# Patient Record
Sex: MALE | URBAN - METROPOLITAN AREA
[De-identification: names, ages, dates, MRNs, and addresses within clinical notes are randomized per-mention and may not be internally consistent; named-entity substitution may affect disease eponyms.]

---

## 2021-05-27 ENCOUNTER — APPOINTMENT (OUTPATIENT)
Dept: URBAN - METROPOLITAN AREA CLINIC 259 | Age: 61
Setting detail: DERMATOLOGY
End: 2021-05-28

## 2021-05-27 DIAGNOSIS — B07.0 PLANTAR WART: ICD-10-CM

## 2021-05-27 DIAGNOSIS — L23.9 ALLERGIC CONTACT DERMATITIS, UNSPECIFIED CAUSE: ICD-10-CM

## 2021-05-27 PROBLEM — D48.5 NEOPLASM OF UNCERTAIN BEHAVIOR OF SKIN: Status: ACTIVE | Noted: 2021-05-27

## 2021-05-27 PROCEDURE — OTHER MIPS QUALITY: OTHER

## 2021-05-27 PROCEDURE — OTHER BENIGN DESTRUCTION: OTHER

## 2021-05-27 PROCEDURE — OTHER PRESCRIPTION: OTHER

## 2021-05-27 PROCEDURE — 88305 TISSUE EXAM BY PATHOLOGIST: CPT

## 2021-05-27 PROCEDURE — 11102 TANGNTL BX SKIN SINGLE LES: CPT | Mod: 59

## 2021-05-27 PROCEDURE — OTHER COUNSELING: OTHER

## 2021-05-27 PROCEDURE — 99213 OFFICE O/P EST LOW 20 MIN: CPT | Mod: 25

## 2021-05-27 PROCEDURE — OTHER PATHOLOGY BILLING: OTHER

## 2021-05-27 PROCEDURE — OTHER BIOPSY BY SHAVE METHOD: OTHER

## 2021-05-27 PROCEDURE — 17110 DESTRUCT B9 LESION 1-14: CPT

## 2021-05-27 RX ORDER — TACROLIMUS 0.3 MG/G
OINTMENT TOPICAL
Qty: 1 | Refills: 3 | Status: ERX

## 2021-05-27 RX ORDER — TACROLIMUS 0.3 MG/G
OINTMENT TOPICAL
Qty: 1 | Refills: 3 | Status: ERX | COMMUNITY
Start: 2021-05-27

## 2021-05-27 ASSESSMENT — LOCATION SIMPLE DESCRIPTION DERM
LOCATION SIMPLE: LEFT CHEEK
LOCATION SIMPLE: RIGHT CHEEK
LOCATION SIMPLE: LEFT PLANTAR SURFACE

## 2021-05-27 ASSESSMENT — LOCATION DETAILED DESCRIPTION DERM
LOCATION DETAILED: LEFT SUPERIOR CENTRAL MALAR CHEEK
LOCATION DETAILED: RIGHT SUPERIOR CENTRAL MALAR CHEEK
LOCATION DETAILED: LEFT PLANTAR FOREFOOT OVERLYING 3RD METATARSAL

## 2021-05-27 ASSESSMENT — LOCATION ZONE DERM
LOCATION ZONE: FACE
LOCATION ZONE: FEET

## 2021-05-27 NOTE — PROCEDURE: BENIGN DESTRUCTION
Include Z78.9 (Other Specified Conditions Influencing Health Status) As An Associated Diagnosis?: No
Treatment Number (Will Not Render If 0): 0
Medical Necessity Information: It is in your best interest to select a reason for this procedure from the list below. All of these items fulfill various CMS LCD requirements except the new and changing color options.
Detail Level: Detailed
Consent: The patient's consent was obtained including but not limited to risks of crusting, scabbing, blistering, scarring, darker or lighter pigmentary change, recurrence, incomplete removal and infection.
Anesthesia Volume In Cc: 0.5
Post-Care Instructions: I reviewed with the patient in detail post-care instructions. Patient is to wear sunprotection, and avoid picking at any of the treated lesions. Pt may apply Vaseline to crusted or scabbing areas.

## 2021-05-27 NOTE — PROCEDURE: BIOPSY BY SHAVE METHOD
Detail Level: Detailed
Silver Nitrate Text: The wound bed was treated with silver nitrate after the biopsy was performed.
Validate Lesion Size: No
Information: Selecting Yes will display possible errors in your note based on the variables you have selected. This validation is only offered as a suggestion for you. PLEASE NOTE THAT THE VALIDATION TEXT WILL BE REMOVED WHEN YOU FINALIZE YOUR NOTE. IF YOU WANT TO FAX A PRELIMINARY NOTE YOU WILL NEED TO TOGGLE THIS TO 'NO' IF YOU DO NOT WANT IT IN YOUR FAXED NOTE.
Wound Care: Petrolatum
Was A Bandage Applied: Yes
Hemostasis: Drysol
Curettage Text: The wound bed was treated with curettage after the biopsy was performed.
Biopsy Type: H and E
Notification Instructions: Patient will be notified of biopsy results. However, patient instructed to call the office if not contacted within 2 weeks.
Dressing: bandage
Size Of Lesion In Cm: 0
Anesthesia Type: 1% lidocaine with epinephrine
Biopsy Method: Dermablade
Billing Type: Third-Party Bill
Electrodesiccation Text: The wound bed was treated with electrodesiccation after the biopsy was performed.
Consent: Written consent was obtained and risks were reviewed including but not limited to scarring, infection, bleeding, scabbing, incomplete removal, nerve damage and allergy to anesthesia.
Electrodesiccation And Curettage Text: The wound bed was treated with electrodesiccation and curettage after the biopsy was performed.
Post-Care Instructions: I reviewed with the patient in detail post-care instructions. Patient is to keep the biopsy site dry overnight, and then apply bacitracin twice daily until healed. Patient may apply hydrogen peroxide soaks to remove any crusting.
Cryotherapy Text: The wound bed was treated with cryotherapy after the biopsy was performed.
Depth Of Biopsy: dermis
Anesthesia Volume In Cc (Will Not Render If 0): 0.5
Type Of Destruction Used: Curettage

## 2021-05-27 NOTE — PROCEDURE: PATHOLOGY BILLING
Immunohistochemistry (17932 and [id number]) billing is not performed here. Please use the Immunohistochemistry Stain Billing plan to accomplish this. Immunohistochemistry (81933 and [id number]) billing is not performed here. Please use the Immunohistochemistry Stain Billing plan to accomplish this.

## 2021-06-09 ENCOUNTER — APPOINTMENT (OUTPATIENT)
Dept: URBAN - METROPOLITAN AREA CLINIC 257 | Age: 61
Setting detail: DERMATOLOGY
End: 2021-06-09

## 2021-06-09 DIAGNOSIS — T1490XA CONTUSION OF UNSPECIFIED SITE: ICD-10-CM

## 2021-06-09 DIAGNOSIS — L84 CORNS AND CALLOSITIES: ICD-10-CM

## 2021-06-09 PROBLEM — S90.32XA CONTUSION OF LEFT FOOT, INITIAL ENCOUNTER: Status: ACTIVE | Noted: 2021-06-09

## 2021-06-09 PROCEDURE — OTHER COUNSELING: OTHER

## 2021-06-09 PROCEDURE — 99213 OFFICE O/P EST LOW 20 MIN: CPT

## 2021-06-09 PROCEDURE — OTHER PRESCRIPTION: OTHER

## 2021-06-09 PROCEDURE — OTHER MIPS QUALITY: OTHER

## 2021-06-09 RX ORDER — CEPHALEXIN 500 MG/1
TABLET ORAL
Qty: 10 | Refills: 0 | Status: ERX | COMMUNITY
Start: 2021-06-09

## 2021-06-09 RX ORDER — BETAMETHASONE VALERATE 1 MG/G
OINTMENT TOPICAL
Qty: 2 | Refills: 0 | Status: ERX | COMMUNITY
Start: 2021-06-09

## 2021-06-09 ASSESSMENT — LOCATION ZONE DERM: LOCATION ZONE: FEET

## 2021-06-09 ASSESSMENT — LOCATION DETAILED DESCRIPTION DERM: LOCATION DETAILED: LEFT PLANTAR FOREFOOT OVERLYING 3RD METATARSAL

## 2021-06-09 ASSESSMENT — LOCATION SIMPLE DESCRIPTION DERM: LOCATION SIMPLE: LEFT PLANTAR SURFACE

## 2021-07-06 ENCOUNTER — APPOINTMENT (OUTPATIENT)
Dept: URBAN - METROPOLITAN AREA CLINIC 257 | Age: 61
Setting detail: DERMATOLOGY
End: 2021-07-06

## 2021-07-06 DIAGNOSIS — L0390 CELLULITIS AND ABSCESS OF UNSPECIFIED SITES: ICD-10-CM

## 2021-07-06 DIAGNOSIS — L0391 CELLULITIS AND ABSCESS OF UNSPECIFIED SITES: ICD-10-CM

## 2021-07-06 DIAGNOSIS — L84 CORNS AND CALLOSITIES: ICD-10-CM

## 2021-07-06 PROBLEM — L02.612 CUTANEOUS ABSCESS OF LEFT FOOT: Status: ACTIVE | Noted: 2021-07-06

## 2021-07-06 PROCEDURE — 99212 OFFICE O/P EST SF 10 MIN: CPT | Mod: 25

## 2021-07-06 PROCEDURE — OTHER PRESCRIPTION: OTHER

## 2021-07-06 PROCEDURE — 10060 I&D ABSCESS SIMPLE/SINGLE: CPT

## 2021-07-06 PROCEDURE — OTHER MIPS QUALITY: OTHER

## 2021-07-06 PROCEDURE — OTHER INCISION AND DRAINAGE: OTHER

## 2021-07-06 PROCEDURE — OTHER COUNSELING: OTHER

## 2021-07-06 RX ORDER — CEPHALEXIN 500 MG/1
TABLET ORAL
Qty: 28 | Refills: 0 | Status: ERX

## 2021-07-06 ASSESSMENT — SEVERITY ASSESSMENT: SEVERITY: MODERATE

## 2021-07-06 ASSESSMENT — LOCATION DETAILED DESCRIPTION DERM: LOCATION DETAILED: LEFT PLANTAR FOREFOOT OVERLYING 3RD METATARSAL

## 2021-07-06 ASSESSMENT — LOCATION SIMPLE DESCRIPTION DERM: LOCATION SIMPLE: LEFT PLANTAR SURFACE

## 2021-07-06 ASSESSMENT — LOCATION ZONE DERM: LOCATION ZONE: FEET

## 2021-07-06 NOTE — PROCEDURE: INCISION AND DRAINAGE
Epidermal Closure: simple interrupted
Method: 15 blade
Curette Text (Optional): After the contents were expressed a curette was used to partially remove the cyst wall.
Drainage Type?: bloody and cyst-like
Lesion Type: Abscess
Post-Care Instructions: I reviewed with the patient in detail post-care instructions. Patient should keep wound covered and call the office should any redness, pain, swelling or worsening occur.
Render Postcare In Note?: Yes
Preparation Text: The area was prepped in the usual clean fashion.
Include Sutures?: No
Dressing: dry sterile dressing
Drainage Amount?: moderate
Detail Level: Detailed
Consent was obtained and risks were reviewed including but not limited to delayed wound healing, infection, need for multiple I and D's, and pain.
Suture Text: The incision was partially closed with
Epidermal Sutures: GluSeal
Size Of Lesion In Cm (Optional But May Be Required For Some Insurances): 0
Wound Care: Petrolatum

## 2023-05-10 ENCOUNTER — OFFICE VISIT (OUTPATIENT)
Dept: CARDIOLOGY | Facility: CLINIC | Age: 63
End: 2023-05-10
Payer: COMMERCIAL

## 2023-05-10 VITALS
BODY MASS INDEX: 35.47 KG/M2 | SYSTOLIC BLOOD PRESSURE: 158 MMHG | WEIGHT: 267.6 LBS | HEART RATE: 69 BPM | HEIGHT: 73 IN | DIASTOLIC BLOOD PRESSURE: 96 MMHG | OXYGEN SATURATION: 97 %

## 2023-05-10 DIAGNOSIS — I10 ESSENTIAL HYPERTENSION: ICD-10-CM

## 2023-05-10 DIAGNOSIS — I25.10 CORONARY ARTERY DISEASE INVOLVING NATIVE CORONARY ARTERY OF NATIVE HEART WITHOUT ANGINA PECTORIS: ICD-10-CM

## 2023-05-10 DIAGNOSIS — Z82.49 FAMILY HISTORY OF ISCHEMIC HEART DISEASE: Primary | ICD-10-CM

## 2023-05-10 PROCEDURE — 99204 OFFICE O/P NEW MOD 45 MIN: CPT | Performed by: INTERNAL MEDICINE

## 2023-05-10 PROCEDURE — 93000 ELECTROCARDIOGRAM COMPLETE: CPT | Performed by: INTERNAL MEDICINE

## 2023-05-10 RX ORDER — LISINOPRIL 10 MG/1
10 TABLET ORAL EVERY 24 HOURS
Qty: 90 TABLET | Refills: 3 | Status: SHIPPED | OUTPATIENT
Start: 2023-05-10

## 2023-05-10 RX ORDER — ASPIRIN 81 MG/1
81 TABLET ORAL DAILY
COMMUNITY
Start: 2023-05-10

## 2023-05-10 RX ORDER — ROSUVASTATIN CALCIUM 20 MG/1
20 TABLET, COATED ORAL DAILY
Qty: 90 TABLET | Refills: 3 | Status: SHIPPED | OUTPATIENT
Start: 2023-05-10

## 2023-05-10 RX ORDER — LISINOPRIL 2.5 MG/1
1.25 TABLET ORAL EVERY 24 HOURS
COMMUNITY
Start: 2022-05-11 | End: 2023-05-10

## 2023-05-10 NOTE — LETTER
5/10/2023    Julio Henson MD  [address]  [facility] [address]    RE: Oscar Rogers       Dear Colleague,     I had the pleasure of seeing Oscar Rogers in the [facility].  HPI and Plan:   See dictation          Orders Placed This Encounter   Procedures    Lipid Profile    ALT    Basic metabolic panel    Lipoprotein (a)    Follow-Up with Cardiology    EKG 12-lead complete w/read - Clinics (performed today)       Orders Placed This Encounter   Medications    DISCONTD: lisinopril (ZESTRIL) 2.5 MG tablet     Sig: Take 1.25 mg by mouth every 24 hours    Multiple Vitamins-Minerals (MULTIVITAMIN MEN 50+) TABS     Sig: Take by mouth daily    Ascorbic Acid (VITAMIN C PO)     Sig: Take by mouth daily    rosuvastatin (CRESTOR) 20 MG tablet     Sig: Take 1 tablet (20 mg) by mouth daily     Dispense:  90 tablet     Refill:  3    lisinopril (ZESTRIL) 10 MG tablet     Sig: Take 1 tablet (10 mg) by mouth every 24 hours     Dispense:  90 tablet     Refill:  3    aspirin 81 MG EC tablet     Sig: Take 1 tablet (81 mg) by mouth daily       Medications Discontinued During This Encounter   Medication Reason    lisinopril (ZESTRIL) 2.5 MG tablet Reorder (No AVS / No eCancel)         Encounter Diagnoses   Name Primary?    Family history of ischemic heart disease Yes    Coronary artery disease involving native coronary artery of native heart without angina pectoris     Essential hypertension        CURRENT MEDICATIONS:  Current Outpatient Medications   Medication Sig Dispense Refill    Ascorbic Acid (VITAMIN C PO) Take by mouth daily      aspirin 81 MG EC tablet Take 1 tablet (81 mg) by mouth daily      lisinopril (ZESTRIL) 10 MG tablet Take 1 tablet (10 mg) by mouth every 24 hours 90 tablet 3    Multiple Vitamins-Minerals (MULTIVITAMIN MEN 50+) TABS Take by mouth daily      rosuvastatin (CRESTOR) 20 MG tablet Take 1 tablet (20 mg) by mouth daily 90 tablet 3       ALLERGIES    "  Allergies   Allergen Reactions    Penicillins Itching, Swelling and Rash       PAST MEDICAL HISTORY:  History reviewed. No pertinent past medical history.    PAST SURGICAL HISTORY:  History reviewed. No pertinent surgical history.    FAMILY HISTORY:  Family History   Problem Relation Age of Onset    Depression Mother     Alcoholism Mother     Alcoholism Father     Cerebrovascular Disease Maternal Grandmother     Alcoholism Maternal Grandfather     Myocardial Infarction Maternal Grandfather     Alcoholism Paternal Grandfather     Alcoholism Brother     Alcoholism Sister     Alcoholism Sister     Alcoholism Sister        SOCIAL HISTORY:  Social History     Socioeconomic History    Marital status:      Spouse name: None    Number of children: None    Years of education: None    Highest education level: None   Tobacco Use    Smoking status: Never    Smokeless tobacco: Never   Substance and Sexual Activity    Alcohol use: Not Currently     Comment: quit 9/28/85       Review of Systems:  Skin:          Eyes:         ENT:         Respiratory:          Cardiovascular:         Gastroenterology:        Genitourinary:         Musculoskeletal:         Neurologic:         Psychiatric:         Heme/Lymph/Imm:         Endocrine:           Physical Exam:  Vitals: BP (!) 158/96   Pulse 69   Ht 1.854 m (6' 1\")   Wt 121.4 kg (267 lb 9.6 oz)   SpO2 97%   BMI 35.31 kg/m      Constitutional:  cooperative, alert and oriented, well developed, well nourished, in no acute distress        Skin:  warm and dry to the touch          Head:  no masses or lesions        Eyes:  pupils equal and round        Lymph:      ENT:  no pallor or cyanosis        Neck:  carotid pulses are full and equal bilaterally, JVP normal, no carotid bruit        Respiratory:  normal breath sounds, clear to auscultation, normal A-P diameter, normal symmetry, normal respiratory excursion, no use of accessory muscles         Cardiac: regular rhythm, normal " S1/S2, no S3 or S4, apical impulse not displaced, no murmurs, gallops or rubs                pulses full and equal, no bruits auscultated                                        GI:  not assessed this visit        Extremities and Muscular Skeletal:  no deformities, clubbing, cyanosis, erythema observed;no edema              Neurological:  no gross motor deficits;affect appropriate        Psych:  Alert and Oriented x 3        CC  Referred Self, MD  No address on file                Service Date: 05/10/2023    HISTORY OF PRESENT ILLNESS:  It was my pleasure to see your patient, Oscar Rogers, in consultation.  This is a very pleasant 62-year-old man with a strong family history of coronary artery disease, who in part of his being proactive with respect to coronary artery disease, had a calcium score performed.  This was markedly abnormal.  The calcium score was 1135, which placed him in the percentile for an age and gender matched control group, indicating that 95% of people of his age and gender are better than he.  His left anterior descending artery with 320.  His circumflex was 118 and his right coronary artery was 367.      As part of the workup for that high calcium score, he had a stress echocardiogram performed on 09/19/2022.  The stress echo was normal with no evidence of ischemia, indicating that the diffusely calcified coronary arteries are not flow obstructing.      His risk factors for coronary artery disease include a strong family history and he also appears to have essential hypertension.  He never smoked cigarettes.  He is not diabetic and his lipids show that he has significantly raised LDL cholesterol of 152, HDL deficiency at 31 and normal triglycerides of 109 with a total cholesterol of 205.      He is not complaining of any chest pains, chest pressure or unusual shortness of breath.      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  He cycles during the summer,  but not in the wintertime.      His blood pressure was raised here today at 158/96.  He was on a miniscule dose of lisinopril, being 1.25 mg per day.      A 12-lead electrocardiogram today showed the patient's EKG was normal.    IMPRESSION:    1.  Coronary artery disease.  The patient has a significantly raised calcium score which puts him at increased risk of adverse cardiac events.  2.  Hyperlipidemia with raised LDL cholesterol.  3.  Hypertensive.  4.  Strong family history.    PLAN:  Firstly, the patient needs to be on a baby aspirin 81 mg per day.  Secondly, he fulfills criteria for secondary prevention guidelines and we will start him on rosuvastatin 20 mg per day.  I have explained to him the side effects that can be associated with this condition including muscle aching, muscle fatigue and liver dysfunction.  We will recheck his lipids in 6 weeks' time with liver function tests.    Given his strong family history of coronary artery disease and his particularly high calcium score, I will also check an Lp(a).  I will have the patient return to see me in 3 months' time to review the results.      Finally, I did recommend to him to avoid salt-containing foods and he should not add salt to his food.  Exercise would be extremely important for him and so he will be riding his bike now again to try and lose weight.  He tells me that he has very high fluctuations in weight from summer to winter where he loses usually around 40 pounds in the summertime when he is riding his bicycle.    It is my pleasure to be involved the care of this nice patient.  I look forward to seeing him again.    Surendra Alvarez MD    cc:    Julio Henson MD   [id number] [facility]  [place] [address]     Surendra Alvarez MD, [facility]        D: 05/10/2023   T: 05/10/2023   MT: DENIA    Name:     JOHNATHAN ARMENTA  MRN:      [id number]        Account:      [id number]   :      1960           Service  Date: 05/10/2023       Document: [id number]     Thank you for allowing me to participate in the care of your patient.      Sincerely,     Surendra Monique MD, MD     [facility]  cc:   Referred Self,

## 2023-05-10 NOTE — PROGRESS NOTES
Service Date: 05/10/2023    HISTORY OF PRESENT ILLNESS:  It was my pleasure to see your patient, Oscar Rogers, in consultation.  This is a very pleasant 62-year-old man with a strong family history of coronary artery disease, who in part of his being proactive with respect to coronary artery disease, had a calcium score performed.  This was markedly abnormal.  The calcium score was 1135, which placed him in the percentile for an age and gender matched control group, indicating that 95% of people of his age and gender are better than he.  His left anterior descending artery with 320.  His circumflex was 118 and his right coronary artery was 367.      As part of the workup for that high calcium score, he had a stress echocardiogram performed on 09/19/2022.  The stress echo was normal with no evidence of ischemia, indicating that the diffusely calcified coronary arteries are not flow obstructing.      His risk factors for coronary artery disease include a strong family history and he also appears to have essential hypertension.  He never smoked cigarettes.  He is not diabetic and his lipids show that he has significantly raised LDL cholesterol of 152, HDL deficiency at 31 and normal triglycerides of 109 with a total cholesterol of 205.      He is not complaining of any chest pains, chest pressure or unusual shortness of breath.      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  He cycles during the summer, but not in the wintertime.      His blood pressure was raised here today at 158/96.  He was on a miniscule dose of lisinopril, being 1.25 mg per day.      A 12-lead electrocardiogram today showed the patient's EKG was normal.    IMPRESSION:    1.  Coronary artery disease.  The patient has a significantly raised calcium score which puts him at increased risk of adverse cardiac events.  2.  Hyperlipidemia with raised LDL cholesterol.  3.  Hypertensive.  4.  Strong family  history.    PLAN:  Firstly, the patient needs to be on a baby aspirin 81 mg per day.  Secondly, he fulfills criteria for secondary prevention guidelines and we will start him on rosuvastatin 20 mg per day.  I have explained to him the side effects that can be associated with this condition including muscle aching, muscle fatigue and liver dysfunction.  We will recheck his lipids in 6 weeks' time with liver function tests.    Given his strong family history of coronary artery disease and his particularly high calcium score, I will also check an Lp(a).  I will have the patient return to see me in 3 months' time to review the results.      Finally, I did recommend to him to avoid salt-containing foods and he should not add salt to his food.  Exercise would be extremely important for him and so he will be riding his bike now again to try and lose weight.  He tells me that he has very high fluctuations in weight from summer to winter where he loses usually around 40 pounds in the summertime when he is riding his bicycle.    It is my pleasure to be involved the care of this nice patient.  I look forward to seeing him again.    Surendra Alvarez MD    cc:    Julio Henson MD   [id number] [facility]  [place] [address]     Surendra Alvarez MD, [facility]        D: 05/10/2023   T: 05/10/2023   MT: DENIA    Name:     JOHNATHAN ARMENTA  MRN:      [id number]        Account:      [id number]   :      1960           Service Date: 05/10/2023       Document: [id number]

## 2023-05-10 NOTE — PROGRESS NOTES
HPI and Plan:   See dictation          Orders Placed This Encounter   Procedures     Lipid Profile     ALT     Basic metabolic panel     Lipoprotein (a)     Follow-Up with Cardiology     EKG 12-lead complete w/read - Clinics (performed today)       Orders Placed This Encounter   Medications     DISCONTD: lisinopril (ZESTRIL) 2.5 MG tablet     Sig: Take 1.25 mg by mouth every 24 hours     Multiple Vitamins-Minerals (MULTIVITAMIN MEN 50+) TABS     Sig: Take by mouth daily     Ascorbic Acid (VITAMIN C PO)     Sig: Take by mouth daily     rosuvastatin (CRESTOR) 20 MG tablet     Sig: Take 1 tablet (20 mg) by mouth daily     Dispense:  90 tablet     Refill:  3     lisinopril (ZESTRIL) 10 MG tablet     Sig: Take 1 tablet (10 mg) by mouth every 24 hours     Dispense:  90 tablet     Refill:  3     aspirin 81 MG EC tablet     Sig: Take 1 tablet (81 mg) by mouth daily       Medications Discontinued During This Encounter   Medication Reason     lisinopril (ZESTRIL) 2.5 MG tablet Reorder (No AVS / No eCancel)         Encounter Diagnoses   Name Primary?     Family history of ischemic heart disease Yes     Coronary artery disease involving native coronary artery of native heart without angina pectoris      Essential hypertension        CURRENT MEDICATIONS:  Current Outpatient Medications   Medication Sig Dispense Refill     Ascorbic Acid (VITAMIN C PO) Take by mouth daily       aspirin 81 MG EC tablet Take 1 tablet (81 mg) by mouth daily       lisinopril (ZESTRIL) 10 MG tablet Take 1 tablet (10 mg) by mouth every 24 hours 90 tablet 3     Multiple Vitamins-Minerals (MULTIVITAMIN MEN 50+) TABS Take by mouth daily       rosuvastatin (CRESTOR) 20 MG tablet Take 1 tablet (20 mg) by mouth daily 90 tablet 3       ALLERGIES     Allergies   Allergen Reactions     Penicillins Itching, Swelling and Rash       PAST MEDICAL HISTORY:  History reviewed. No pertinent past medical history.    PAST SURGICAL HISTORY:  History reviewed. No pertinent  "surgical history.    FAMILY HISTORY:  Family History   Problem Relation Age of Onset     Depression Mother      Alcoholism Mother      Alcoholism Father      Cerebrovascular Disease Maternal Grandmother      Alcoholism Maternal Grandfather      Myocardial Infarction Maternal Grandfather      Alcoholism Paternal Grandfather      Alcoholism Brother      Alcoholism Sister      Alcoholism Sister      Alcoholism Sister        SOCIAL HISTORY:  Social History     Socioeconomic History     Marital status:      Spouse name: None     Number of children: None     Years of education: None     Highest education level: None   Tobacco Use     Smoking status: Never     Smokeless tobacco: Never   Substance and Sexual Activity     Alcohol use: Not Currently     Comment: quit 9/28/85       Review of Systems:  Skin:          Eyes:         ENT:         Respiratory:          Cardiovascular:         Gastroenterology:        Genitourinary:         Musculoskeletal:         Neurologic:         Psychiatric:         Heme/Lymph/Imm:         Endocrine:           Physical Exam:  Vitals: BP (!) 158/96   Pulse 69   Ht 1.854 m (6' 1\")   Wt 121.4 kg (267 lb 9.6 oz)   SpO2 97%   BMI 35.31 kg/m      Constitutional:  cooperative, alert and oriented, well developed, well nourished, in no acute distress        Skin:  warm and dry to the touch          Head:  no masses or lesions        Eyes:  pupils equal and round        Lymph:      ENT:  no pallor or cyanosis        Neck:  carotid pulses are full and equal bilaterally, JVP normal, no carotid bruit        Respiratory:  normal breath sounds, clear to auscultation, normal A-P diameter, normal symmetry, normal respiratory excursion, no use of accessory muscles         Cardiac: regular rhythm, normal S1/S2, no S3 or S4, apical impulse not displaced, no murmurs, gallops or rubs                pulses full and equal, no bruits auscultated                                        GI:  not assessed this " visit        Extremities and Muscular Skeletal:  no deformities, clubbing, cyanosis, erythema observed;no edema              Neurological:  no gross motor deficits;affect appropriate        Psych:  Alert and Oriented x 3        CC  Referred Self, MD  No address on file

## 2023-05-21 ENCOUNTER — HEALTH MAINTENANCE LETTER (OUTPATIENT)
Age: 63
End: 2023-05-21

## 2023-06-08 ENCOUNTER — APPOINTMENT (OUTPATIENT)
Dept: URBAN - METROPOLITAN AREA CLINIC 257 | Age: 63
Setting detail: DERMATOLOGY
End: 2023-06-08

## 2023-06-08 DIAGNOSIS — L91.8 OTHER HYPERTROPHIC DISORDERS OF THE SKIN: ICD-10-CM

## 2023-06-08 PROCEDURE — OTHER SKIN TAG REMOVAL MULTI: OTHER

## 2023-06-08 PROCEDURE — OTHER COUNSELING: OTHER

## 2023-06-08 PROCEDURE — OTHER MIPS QUALITY: OTHER

## 2023-06-08 PROCEDURE — 11200 RMVL SKIN TAGS UP TO&INC 15: CPT

## 2023-06-08 ASSESSMENT — LOCATION DETAILED DESCRIPTION DERM
LOCATION DETAILED: RIGHT ANTERIOR SCROTUM
LOCATION DETAILED: LEFT ANTERIOR SCROTUM

## 2023-06-08 ASSESSMENT — LOCATION ZONE DERM: LOCATION ZONE: GENITALIA

## 2023-06-08 ASSESSMENT — LOCATION SIMPLE DESCRIPTION DERM: LOCATION SIMPLE: SCROTUM

## 2023-06-08 NOTE — HPI: MOLE CHECK
What Is The Reason For Today's Visit?: Mole Check
Additional History: Recently noticed the lesion within the last few months on his scrotum. Confirms itch and possible bleeding.

## 2023-06-08 NOTE — PROCEDURE: SKIN TAG REMOVAL MULTI
Total Number Of Lesions Treated: 2
Consent: Written consent obtained and the risks of skin tag removal was reviewed with the patient including but not limited to bleeding, pigmentary change, infection, pain, and remote possibility of scarring.
Detail Level: Detailed
Medical Necessity Information: It is in your best interest to select a reason for this procedure from the list below. All of these items fulfill various CMS LCD requirements except the new and changing color options.
Medical Necessity Clause: This procedure was medically necessary because the lesions that were treated were:
Include Z78.9 (Other Specified Conditions Influencing Health Status) As An Associated Diagnosis?: No
Add Associated Diagnoses If Applicable When Selecting Medical Necessity: Yes
Anesthesia Type: 1% lidocaine with epinephrine

## 2023-06-26 ENCOUNTER — APPOINTMENT (OUTPATIENT)
Dept: URBAN - METROPOLITAN AREA CLINIC 257 | Age: 63
Setting detail: DERMATOLOGY
End: 2023-06-27

## 2023-06-26 DIAGNOSIS — L91.8 OTHER HYPERTROPHIC DISORDERS OF THE SKIN: ICD-10-CM

## 2023-06-26 DIAGNOSIS — L72.3 SEBACEOUS CYST: ICD-10-CM

## 2023-06-26 PROCEDURE — OTHER DEFER: OTHER

## 2023-06-26 PROCEDURE — OTHER MIPS QUALITY: OTHER

## 2023-06-26 PROCEDURE — 11200 RMVL SKIN TAGS UP TO&INC 15: CPT

## 2023-06-26 PROCEDURE — OTHER SKIN TAG REMOVAL: OTHER

## 2023-06-26 PROCEDURE — OTHER COUNSELING: OTHER

## 2023-06-26 PROCEDURE — 99212 OFFICE O/P EST SF 10 MIN: CPT | Mod: 25

## 2023-06-26 ASSESSMENT — LOCATION ZONE DERM
LOCATION ZONE: LEG
LOCATION ZONE: NECK

## 2023-06-26 ASSESSMENT — LOCATION DETAILED DESCRIPTION DERM
LOCATION DETAILED: LEFT POSTERIOR NECK
LOCATION DETAILED: LEFT ANTERIOR PROXIMAL THIGH

## 2023-06-26 ASSESSMENT — LOCATION SIMPLE DESCRIPTION DERM
LOCATION SIMPLE: POSTERIOR NECK
LOCATION SIMPLE: LEFT THIGH

## 2023-06-26 NOTE — PROCEDURE: DEFER
Size Of Lesion In Cm (Optional): 0
Instructions (Optional): Please schedule a 45 minute removal.
Introduction Text (Please End With A Colon): The following procedure was deferred:
Detail Level: Detailed

## 2023-06-26 NOTE — PROCEDURE: SKIN TAG REMOVAL
Consent: Written consent obtained and the risks of skin tag removal was reviewed with the patient including but not limited to bleeding, pigmentary change, infection, pain, and remote possibility of scarring.
Include Z78.9 (Other Specified Conditions Influencing Health Status) As An Associated Diagnosis?: No
Add Associated Diagnoses If Applicable When Selecting Medical Necessity: Yes
Detail Level: Detailed
Medical Necessity Information: It is in your best interest to select a reason for this procedure from the list below. All of these items fulfill various CMS LCD requirements except the new and changing color options.
Anesthesia Volume In Cc: 3
Medical Necessity Clause: This procedure was medically necessary because the lesions that were treated were:
Anesthesia Type: 1% lidocaine with epinephrine

## 2023-08-30 ENCOUNTER — TELEPHONE (OUTPATIENT)
Dept: UROLOGY | Facility: CLINIC | Age: 63
End: 2023-08-30
Payer: COMMERCIAL

## 2023-08-30 NOTE — TELEPHONE ENCOUNTER
Health Call Center    Phone Message    May a detailed message be left on voicemail: yes     Reason for Call: Other: Patient needs an appointment in the next week for acute epididymitis. Dr. Sandro Truong from the [facility] called and stated this patient needs an appointment in the next week. Writer unable to schedule, so sending encounter message for review and follow-up with patient for scheduling. Thank you!     Action Taken: Message routed to:  [facility] ([facility]    Travel Screening: Not Applicable

## 2023-09-01 NOTE — TELEPHONE ENCOUNTER
"Patient wanted to switch appointment with Cely Amaral to in person, but didn't realized how far away [place] is from him home. Patient stated \"it is getting better\" and will go to his other appointment on September 7th with a different provider and closer location.    Appointment in [place] cancelled.  Suzie DALLAS CMA 09/01/23 8:28 AM  "

## 2024-10-12 ENCOUNTER — HEALTH MAINTENANCE LETTER (OUTPATIENT)
Age: 64
End: 2024-10-12